# Patient Record
(demographics unavailable — no encounter records)

---

## 2025-01-15 NOTE — HISTORY OF PRESENT ILLNESS
[FreeTextEntry1] : Who presents today accompanied by her grandmother with Mom over the phone. Grandmother states approximately 1 year ago, she noticed that Chantale walked abnormally.  She was concerned that the right leg seems to bow out.  This is not something that was noticed prior to about 1 year ago.  There is no known family history for any lower extremity deformities.  Chantale denies any discomfort or pain in her knee or leg.  She had been taken to an outside institution where x-rays of the knee were performed and grandmother reports that these images were negative.  Initial office visit was on 6/19/2024.  At that time she was noted to have a varus deformity of the right knee.  Lower extremity alignment x-rays were taken which demonstrated valgus alignment coming from the femur, joint line, and tibia.  Recommendation was to obtain an MRI to further evaluate.  She returns today to review her MRI results.    Menarche age 11.

## 2025-01-15 NOTE — REASON FOR VISIT
[Consultation] : a consultation visit [FreeTextEntry1] : RLE alignment, gait abnormality [Family Member] : family member [Patient] : patient [Other: _____] : [unfilled]

## 2025-01-15 NOTE — PHYSICAL EXAM
[FreeTextEntry1] : Healthy appearing 12 year-old child. Awake, alert, in no acute distress. Pleasant and cooperative.  Eyes are clear with no sclera abnormalities. External ears, nose and mouth are clear.  Good respiratory effort with no audible wheezing without use of a stethoscope. Ambulates independently with slight lateral thrust.  There is bowing of the RLE.  Good coordination and balance. Able to get on and off exam table without difficulty.  Lower Extremities: Skin is clean and intact.  Good overall alignment of the left lower extremity.  The right lower extremity appears to have genu varum deformity No swelling, erythema, or ecchymosis. No lymphedema. Grossly non tender to palpation over LE Full ROM bilateral hips/knees/ankles. SILT, 5/5 strength EHL/FHL/ TA/GS DP 2+, Brisk cap refill <2 seconds No lymphedema

## 2025-01-15 NOTE — DATA REVIEWED
[de-identified] : Standing and supine leg length x-rays taken today on 1/15/2025 were viewed and independently interpreted:  There is alignment of the right lower extremity.  Mechanical axis passes through zone 3 medially, however there is slight interval improvement as compared to her previous leg length films in June 2024.  Improvement of joint line obliquity noted in supine films.  MRI R tibia performed on 12/16/24 were viewed and independently interpreted: 1.  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  My interpretation and review of images taken today, 06/20/2024, in office: Leg length x-rays were obtained in office today. R LDFA 92.9. R MPTA 90.1

## 2025-01-15 NOTE — ASSESSMENT
[FreeTextEntry1] : Chantale is a 12-year-old female with appearance of genu varum in right lower extremity.  Today's visit included obtaining the history from the child and parent, due to the child's age, the child could not be considered a reliable historian, requiring the parent to act as an independent historian. The condition, natural history, and prognosis were explained to the patient and family. The clinical findings and images were reviewed with the family.   Standing and supine leg length x-rays taken today on 1/15/2025 were viewed and independently interpreted:  There is alignment of the right lower extremity.  Mechanical axis passes through zone 3 medially, however there is slight interval improvement as compared to her previous leg length films in June 2024.  Improvement of joint line obliquity noted in supine films.  MRI R tibia performed on 12/16/24 were viewed and independently interpreted: 1.  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  Recommendation at this time is for surgical intervention for right lateral distal femoral hemiepiphysiodesis and right lateral proximal tibial hemiepiphysiodesis.  The risks and benefits of surgery were discussed with the patient's family.  The expected pre-, anton-, and postoperative course was discussed.  The family will discuss further and reach out to the office if they would like to proceed with surgery, if they would like to continue with observation with possible future corrective osteotomy, then I will plan to see her back in 6 months for a standing leg length x-ray.  All questions were answered, the family expresses understanding and agrees with the plan of care.   This note was generated using Dragon medical dictation software. A reasonable effort has been made for proofreading its contents, but typos may still remain. If there are any questions or points of clarification needed please do not hesitate to contact my office.

## 2025-03-20 NOTE — POST OP
[Doing Well] : is doing well [Excellent Pain Control] : has excellent pain control [No Sign of Infection] : is showing no signs of infection [de-identified] : s/p Right lateral distal femoral hemiepiphysiodesis, right proximal tibial lateral hemiepiphysiodesis for diagnosis of Right lower extremity genu varum, [place] disease. DOS 2/25/25 [de-identified] :  The patient is a 12-year-old female, who presented to my office with her grandma due to concerns of a bowed appearance of her right lower extremity.  X-rays at that time demonstrated genu varum with a possible Kendrick lesion of the right proximal tibia.  Recommendation at that time was to obtain an MRI to further evaluate.  MRI was obtained and reviewed in the office in [facility].  MRI showed changes over the medial tibial metaphysis adjacent to the  growth plate consistent with [place] disease.  Lower extremity alignment x-rays demonstrated varus deformity coming from the distal femur and the proximal tibia.  Therefore, recommendation was to proceed with surgical intervention for a right lateral distal  femoral hemiepiphysiodesis and right lateral proximal tibia hemiepiphysiodesis. She is now 3 weeks 1 day out from above procedure.  She has been doing well. She reports some post operative pain initially after the surgery that has since improved. She is walking without crutches and does not require any medication. She has been back to school but out of gym. She also uses the elevator at school, as there are a lot of stairs. Here today for post operative check. Denies fever, chills, incisional issues, nausea or vomiting.  [de-identified] : Healthy appearing 13 year-old child. Awake, alert, in no acute distress. Pleasant and cooperative.  Eyes are clear with no sclera abnormalities. External ears, nose and mouth are clear.  Good respiratory effort with no audible wheezing without use of a stethoscope. Ambulates independently with slight limp. Good coordination and balance. Able to get on and off exam table without difficulty.   Incisions are well healed. Small monocril tail trimmed today. No erythtema, dehiscence, drainage, swelling or induration. No signs of infection.  She is able to fully straighten the knee, Flexion near full SILT distally, calf soft and non tender DP 2+, SILT [de-identified] : My interpretation and review of images taken today, 03/19/2025, in office:  Right knee XR taken today demonstrate lateral hardware at distal femoral physis and proximal tibial physis intact, no change from intraop imaging.  [de-identified] : The history was obtained today from the child and parent; given the patient's age and/or the child's mental capacity, the history was unreliable and the parent was used as an independent historian.   - Chantale is doing very well following her surgery - We trimmed her monocril suture today. No signs of infection noted to surgical wounds. - She will remain out of gym and sports for an additional week. We requested elevator access at school as well for that time frame. After that, she may wean back to activities without restriction. School note provided.  - We will plan to see her back in 4 months for repeat clinical check as well as leg length XR.   This plan was discussed with family and all questions and concerns were addressed today.  IPatricia PA-C, have acted as a scribe and documented the above for Dr. Kat Palafox [de-identified] : 13yF s/p  Right lateral distal femoral hemiepiphysiodesis, right proximal tibial lateral hemiepiphysiodesis for diagnosis of Right lower extremity genu varum, Kendrick disease. DOS 2/25/25

## 2025-03-20 NOTE — END OF VISIT
[FreeTextEntry3] : A physician assistant/resident assisted with documenting the visit and acted as a scribe. I have seen and examined the patient, made my assessment and plan and have made all modifications necessary to the note.  Kat Palafox MD Pediatric Orthopaedics Surgery [facility]

## 2025-03-20 NOTE — END OF VISIT
[FreeTextEntry3] : A physician assistant/resident assisted with documenting the visit and acted as a scribe. I have seen and examined the patient, made my assessment and plan and have made all modifications necessary to the note.  Kat Palafox MD Pediatric Orthopaedics Surgery [facility]

## 2025-03-20 NOTE — POST OP
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 DOS 2/25/25 [de-identified] :  The patient is a 12-year-old female, who presented to my office with her grandma due to concerns of a bowed appearance of her right lower extremity.  X-rays at that time demonstrated genu varum with a possible Kendrick lesion of the right proximal tibia.  Recommendation at that time was to obtain an MRI to further evaluate.  MRI was obtained and reviewed in the office in [facility].  MRI showed changes over the medial tibial metaphysis adjacent to the  growth plate consistent with [place] disease.  Lower extremity alignment x-rays demonstrated varus deformity coming from the distal femur and the proximal tibia.  Therefore, recommendation was to proceed with surgical intervention for a right lateral distal  femoral hemiepiphysiodesis and right lateral proximal tibia hemiepiphysiodesis. She is now 3 weeks 1 day out from above procedure.  She has been doing well. She reports some post operative pain initially after the surgery that has since improved. She is walking without crutches and does not require any medication. She has been back to school but out of gym. She also uses the elevator at school, as there are a lot of stairs. Here today for post operative check. Denies fever, chills, incisional issues, nausea or vomiting.  [de-identified] : Healthy appearing 13 year-old child. Awake, alert, in no acute distress. Pleasant and cooperative.  Eyes are clear with no sclera abnormalities. External ears, nose and mouth are clear.  Good respiratory effort with no audible wheezing without use of a stethoscope. Ambulates independently with slight limp. Good coordination and balance. Able to get on and off exam table without difficulty.   Incisions are well healed. Small monocril tail trimmed today. No erythtema, dehiscence, drainage, swelling or induration. No signs of infection.  She is able to fully straighten the knee, Flexion near full SILT distally, calf soft and non tender DP 2+, SILT [de-identified] : My interpretation and review of images taken today, 03/19/2025, in office:  Right knee XR taken today demonstrate lateral hardware at distal femoral physis and proximal tibial physis intact, no change from intraop imaging.  [de-identified] : The history was obtained today from the child and parent; given the patient's age and/or the child's mental capacity, the history was unreliable and the parent was used as an independent historian.   - Chantale is doing very well following her surgery - We trimmed her monocril suture today. No signs of infection noted to surgical wounds. - She will remain out of gym and sports for an additional week. We requested elevator access at school as well for that time frame. After that, she may wean back to activities without restriction. School note provided.  - We will plan to see her back in 4 months for repeat clinical check as well as leg length XR.   This plan was discussed with family and all questions and concerns were addressed today.  IPatricia PA-C, have acted as a scribe and documented the above for Dr. Kat Palafox [de-identified] : 13yF s/p  Right lateral distal femoral hemiepiphysiodesis, right proximal tibial lateral hemiepiphysiodesis for diagnosis of Right lower extremity genu varum, Kendrick disease. DOS 2/25/25

## 2025-07-16 NOTE — DATA REVIEWED
[de-identified] : Xray imaging of leg lengths AP was taken, obtained, and independently reviewed today 07/16/25: Hardware is in place. Growth plates are still open. MAD passes through zone 2 medially.

## 2025-07-16 NOTE — HISTORY OF PRESENT ILLNESS
[0] : currently ~his/her~ pain is 0 out of 10 [FreeTextEntry1] : Patient is a 13 yo F s/p right lateral distal femoral hemiepiphysiodesis, right proximal tibial lateral hemiepiphysiodesis for diagnosis of Right lower extremity genu varum, Kendrick disease. DOS 2/25/25. She is here for her 4 month follow up. Today, she reports she has been overall doing well. Denies any pain or swelling. Ambulating well. Has been doing all activity with no restrictions. Denies fever, chills, nausea, vomiting, numbness or tingling. Here today for follow up.

## 2025-07-16 NOTE — REASON FOR VISIT
[Follow Up] : a follow up visit [Patient] : patient [Mother] : mother [FreeTextEntry1] : s/p Right lateral distal femoral hemiepiphysiodesis, right proximal tibial lateral hemiepiphysiodesis for diagnosis of Right lower extremity genu varum, [place] disease. DOS 2/25/25

## 2025-07-16 NOTE — PHYSICAL EXAM
[FreeTextEntry1] : Patient is AAOx3. Pleasant and cooperative with exam, appropriate for age. NAD and alert.  Skin: The skin is intact, warm, pink and dry over the area examined. No rashes, lesions, or nodules. Eyes: Normal conjunctiva, normal eyelids and pupils were equal and round.  ENT: Normal ears, normal nose, and normal lips. Cardiovascular: Brisk capillary refill. Peripheral pulses intact. No peripheral edema. Respiratory: NAD. Taking full breaths without use of accessory muscles or evidence of audible wheezes or stridor without the use of a stethoscope. Normal respiratory effort.  Abdomen: Not examined.   Bilateral lower extremity exam: Incisions are well healed Good overall alignment Grossly nontender to palpation over LE No erythema, dehiscence, drainage, or swelling FROM of knee symmetrically FROM of b/l hips and ankles Calf soft and nontender  DP 2+, BCR SILT Sensation intact NV intact 5/5 strength EHL/FHL/ TA/GS

## 2025-07-16 NOTE — END OF VISIT
[FreeTextEntry3] : I, Kat Palafox MD, personally saw and evaluated the patient and developed the plan as documented above. I concur or have edited the note as appropriate.

## 2025-07-16 NOTE — REVIEW OF SYSTEMS
[Appropriate Age Development] : development appropriate for age [Nl] : Genitourinary [No Acute Changes] : No acute changes since previous visit [Change in Activity] : no change in activity [Fever Above 102] : no fever [Wgt Loss (___ Lbs)] : no recent weight loss [Limping] : no limping [Joint Pains] : no arthralgias [Joint Swelling] : no joint swelling [Muscle Aches] : no muscle aches

## 2025-07-16 NOTE — ASSESSMENT
[FreeTextEntry1] : 14 yo F, s/p Right lateral distal femoral hemiepiphysiodesis, right proximal tibial lateral hemiepiphysiodesis for diagnosis of Right lower extremity genu varum, Kendrick disease. DOS 2/25/25  The history for today's visit was obtained from the child as well as the parent. The child's history was unreliable alone due to age and therefore, the parent was used today as an independent historian. Xray imaging of leg lengths AP was taken, obtained, and independently reviewed today 07/16/25: Hardware is in place. Growth plates are still open.   The patient has been doing well since surgery. Her limp has improved. Has had no pain. Her growth plates are still slightly open. Clinical findings and imaging were discussed at length with the family. Discussed that there has been some correction of her genu varum. Will likely consider removal of hardware once her growth plates are fully closed to maximize correction. Recommendation at this time is follow up in 4 months with repeat leg length xrays. All concerns were addressed. Family agreed to plan and have no further questions at this time.   Xray leg lengths AP at next visit   I, Ritika Bell PA-C, have acted as scribe and documented the above for Dr. Palafox for this encounter.